# Patient Record
(demographics unavailable — no encounter records)

---

## 2017-09-08 NOTE — PD
__________________________________________________





History of Present Illness


Chief Complaint:  Psychiatric Symptoms


Time Seen by Provider:  11:30


Travel History


International Travel<30 Days:  No


Contact w/Intl Traveler<30days:  No


Known affected area:  No





Legal Status


Legal Status:  Baker Act


Baker Act Signed By:  Jason Tyler


Baker Act Comment:  Signed by [facility] D/S Evangelina Jones #[id number].


History of Present Illness:


32-year-old male brought in under a Severino act for cutting his left forearm last 

night and making suicidal threats according to his mother.  Patient denies 

being suicidal and states he had made a bet of $150 with a neighbor to cut his 

left forearm.  Patient states he did not mean to cut his arm so deeply and that 

his behavior was "dumb" and a "mistake".  Patient's mother has been spoken to, 

with patient's permission, and reiterates over the telephone that her son is 

mentally ill and that she has home security footage that he cut himself.  She 

further states he has been threatening suicide for the last few days.  Patient 

denies any suicidal or homicidal ideation, plan or intent at this time.  He 

demonstrates no psychotic symptoms and his cognition is intact.  He reports 

significant difficulty getting along with his mother and claims the last time 

he was here, he was incarcerated and she forged papers to obtain his truck and 

take his money.  Mother claims there is currently a warrant for his arrest and 

she wants his mental health issues addressed for the next 3 days of the Severino 

act.  She does not want her son at her home.  Patient was noted to be 

intoxicated last night and had an alcohol level of approximately 150 earlier 

this morning.  However, at this time he is not intoxicated and he is competent 

to make decisions.  He is verbally betina for safety and wants to get his 

vehicle and go back to [place] where he lives. 





This physician discussed the case with Robbie Holt, psychiatric service line 

, and both Mr. Holt and this physician feel the patient is not 

qualifying for a Severino act or involuntary psychiatric hospitalization at this 

time.  Patient appears to have personality characteristics which are 

interfering in his life, but these will not be changed during a three-day 

hospitalization.  Therefore, despite the ongoing risk of this patient acting out

, doing something dangerous to himself, or others, this risk is unpredictable 

and unavoidable.





[facility]


Past Medical History


Medical History:  Denies Significant Hx


Tetanus Vaccination:  < 5 Years


Influenza Vaccination:  No





Past Surgical History


Tonsillectomy:  Yes (AND ADENOIDS)





Psychiatric History


Psychiatric History


Hx Psychiatric Treatment:  


Patient denies any Hx of psychiatric tx.











States that he drinks ETOH approx 2x week. Stated that he uses marijuanna


approx 3-4 times a week.


once a week.


History of Inpatient Treatment:  No


Guns or firearms in home:  No





Social History


Hx Alcohol Use:  Yes (OCC)


Hx Tobacco Use:  Yes


Hx Substance Use:  No (Pt denies any Hx of abuse.)


Substance Use Type:  Alcohol, Marijuana


Hx of Substance Use Treatment:  No





Allergies-Medications


(Allergen,Severity, Reaction):  


Coded Allergies:  


     Penicillins (Verified  Allergy, Intermediate, RASH, 9/8/17)


     bee venom protein (honey bee) (Verified  Allergy, Unknown, RASH, 9/8/17)


     clindamycin (Verified  Allergy, Unknown, RASH, 9/8/17)


Reported Meds & Prescriptions





Reported Meds & Active Scripts


Active


No Active Prescriptions or Reported Medications





Review of Systems


Except as stated in HPI:  all other systems reviewed are Neg





Exam


Alert:  Yes


[place]:  Person, Place, Date, Situation


Mood:  Calm


Affect:  Appropriate


Speech:  Clear, Logical


Eye Contact:  Normal


Memory Intact:  Immediate, Recent, Remote


Delusions:  No


Insight/Judgement


Adequate





MDM


Medical Decision Making


Medical Record Reviewed:  Yes


Assessment/Plan


Medical record reviewed, patient interviewed at bedside, case discussed with 

nurse and service line .  Patient does not qualify for Baker acted 

this time and he does not qualify for involuntary psychiatric hospitalization.  

This physician appreciates the patient's mother's concerns, but the patient 

remains competent to make medical decisions and he is verbally betina for 

safety.  He demonstrates no suicidal or homicidal ideation, plan or intent at 

this time, no psychotic symptoms and his cognition is intact.  He can certainly 

follow up in [place], where he lives, with outpatient mental health treatment.  He 

has been recommended to do so.





Orders





 Orders


Complete Blood Count With Diff (9/8/17 05:32)


Basic Metabolic Panel (Bmp) (9/8/17 05:32)


Psych Screen (9/8/17 05:32)


Drug Screen, Random Urine (9/8/17 05:32)


Alcohol (Ethanol) (9/8/17 05:32)


Diet Regular Basic (9/8/17 Breakfast)





Results





Vital Signs








  Date Time  Temp Pulse Resp B/P (MAP) Pulse Ox O2 Delivery O2 Flow Rate FiO2


 


9/8/17 09:49 99.4 69 18 126/79 (95) 98 Room Air  


 


9/8/17 08:36        


 


9/8/17 07:05 97.8 78 17 124/81 (95) 99 Room Air  


 


9/8/17 07:05  78 17     


 


9/8/17 05:34 99.4 91 18 136/88 (104) 98   











Laboratory Tests








Test


  9/8/17


05:47 9/8/17


09:25


 


White Blood Count 10.1  


 


Red Blood Count 4.84  


 


Hemoglobin 15.5  


 


Hematocrit 45.3  


 


Mean Corpuscular Volume 93.7  


 


Mean Corpuscular Hemoglobin 32.1  


 


Mean Corpuscular Hemoglobin


Concent 34.3 


  


 


 


Red Cell Distribution Width 14.2  


 


Platelet Count 280  


 


Mean Platelet Volume 7.0  


 


Neutrophils (%) (Auto) 77.1  


 


Lymphocytes (%) (Auto) 16.8  


 


Monocytes (%) (Auto) 5.4  


 


Eosinophils (%) (Auto) 0.0  


 


Basophils (%) (Auto) 0.7  


 


Neutrophils # (Auto) 7.8  


 


Lymphocytes # (Auto) 1.7  


 


Monocytes # (Auto) 0.5  


 


Eosinophils # (Auto) 0.0  


 


Basophils # (Auto) 0.1  


 


CBC Comment DIFF FINAL  


 


Differential Comment   


 


Blood Urea Nitrogen 11  


 


Creatinine 1.02  


 


Random Glucose 97  


 


Calcium Level 8.6  


 


Sodium Level 140  


 


Potassium Level 4.1  


 


Chloride Level 110  


 


Carbon Dioxide Level 21.0  


 


Anion Gap 9  


 


Estimat Glomerular Filtration


Rate 85 


  


 


 


Ethyl Alcohol Level 147  


 


Urine Opiates Screen  NEG 


 


Urine Barbiturates Screen  NEG 


 


Urine Amphetamines Screen  NEG 


 


Urine Benzodiazepines Screen  NEG 


 


Urine Cocaine Screen  NEG 


 


Urine Cannabinoids Screen  POS 











Diagnosis





 Primary Impression:  


 Adjustment disorder with mixed disturbance of emotions and conduct


 Additional Impression:  


 Alcohol abuse


Prescriptions


No Active Prescriptions or Reported Meds


Condition:  Stable





Problem Qualifiers











Aleksandr Lua MD Sep 8, 2017 12:18

## 2017-09-08 NOTE — PD
HPI


Chief Complaint:  Psychiatric Symptoms


Time Seen by Provider:  05:31


Travel History


International Travel<30 days:  No


Contact w/Intl Traveler<30days:  No


Traveled to known affect area:  No





History of Present Illness


HPI


32-year-old male presents to the emergency department under Baker act for 

psychiatric evaluation.  Patient cut his left forearm this evening.  Patient 

states he does not know why he didn't you is "being stupid."  Per the Severino act

, the patient has had suicide attempts in the past and his mother was scared 

this was one of those.  Patient denies any psychiatric history.  Denies any 

illegal drug use except for marijuana.  He said he drinks alcohol sometimes.  

Patient was initially seen and evaluated at [facility] where the 

laceration was cleansed and approximated.  Patient was placed in a splint.  He 

has no other symptoms to report against time.





[facility]


Past Medical History


Medical History:  Denies Significant Hx


Tetanus Vaccination:  < 5 Years


Influenza Vaccination:  No





Past Surgical History


Tonsillectomy:  Yes (AND ADENOIDS)





Social History


Alcohol Use:  Yes (OCC)


Tobacco Use:  Yes


Substance Use:  No





Allergies-Medications


(Allergen,Severity, Reaction):  


Coded Allergies:  


     Penicillins (Verified  Allergy, Unknown, 9/8/17)


     bee venom protein (honey bee) (Verified  Allergy, Unknown, 9/8/17)


     clindamycin (Verified  Allergy, Unknown, 9/8/17)


Reported Meds & Prescriptions





Reported Meds & Active Scripts


Active


No Active Prescriptions or Reported Medications    








Review of Systems


Except as stated in HPI:  all other systems reviewed are Neg





Physical Exam


Narrative


GENERAL: Well-nourished male patient, with bizarre affect, no acute distress


SKIN: Focused skin assessment warm/dry.


HEAD: Atraumatic. Normocephalic. 


EYES: Pupils equal and round. No scleral icterus. No injection or drainage. 


ENT: No nasal bleeding or discharge.  Mucous membranes pink and moist.


NECK: Trachea midline. No JVD. 


CARDIOVASCULAR: Regular rate and rhythm.  No murmur appreciated.


RESPIRATORY: No accessory muscle use. Clear to auscultation. Breath sounds 

equal bilaterally. 


GASTROINTESTINAL: Abdomen soft, non-tender, nondistended. Hepatic and splenic 

margins not palpable. 


MUSCULOSKELETAL: No obvious deformities. No clubbing.  No cyanosis.  No edema.  

Left upper extremity splint in place.  Cap refill within normal limits of the 

affected extremity.


NEUROLOGICAL: Awake and alert. No obvious cranial nerve deficits.  Motor 

grossly within normal limits. Normal speech.





Data


Data


Last Documented VS





Vital Signs








  Date Time  Temp Pulse Resp B/P (MAP) Pulse Ox O2 Delivery O2 Flow Rate FiO2


 


9/8/17 05:34 99.4 91 18 136/88 (104) 98   








Orders





 Orders


Complete Blood Count With Diff (9/8/17 05:32)


Basic Metabolic Panel (Bmp) (9/8/17 05:32)


Psych Screen (9/8/17 05:32)


Drug Screen, Random Urine (9/8/17 05:32)


Alcohol (Ethanol) (9/8/17 05:32)





Labs





Laboratory Tests








Test


  9/8/17


05:47


 


White Blood Count 10.1 TH/MM3 


 


Red Blood Count 4.84 MIL/MM3 


 


Hemoglobin 15.5 GM/DL 


 


Hematocrit 45.3 % 


 


Mean Corpuscular Volume 93.7 FL 


 


Mean Corpuscular Hemoglobin 32.1 PG 


 


Mean Corpuscular Hemoglobin


Concent 34.3 % 


 


 


Red Cell Distribution Width 14.2 % 


 


Platelet Count 280 TH/MM3 


 


Mean Platelet Volume 7.0 FL 


 


Neutrophils (%) (Auto) 77.1 % 


 


Lymphocytes (%) (Auto) 16.8 % 


 


Monocytes (%) (Auto) 5.4 % 


 


Eosinophils (%) (Auto) 0.0 % 


 


Basophils (%) (Auto) 0.7 % 


 


Neutrophils # (Auto) 7.8 TH/MM3 


 


Lymphocytes # (Auto) 1.7 TH/MM3 


 


Monocytes # (Auto) 0.5 TH/MM3 


 


Eosinophils # (Auto) 0.0 TH/MM3 


 


Basophils # (Auto) 0.1 TH/MM3 


 


CBC Comment DIFF FINAL 


 


Differential Comment  


 


Blood Urea Nitrogen 11 MG/DL 


 


Creatinine 1.02 MG/DL 


 


Random Glucose 97 MG/DL 


 


Calcium Level 8.6 MG/DL 


 


Sodium Level 140 MEQ/L 


 


Potassium Level 4.1 MEQ/L 


 


Chloride Level 110 MEQ/L 


 


Carbon Dioxide Level 21.0 MEQ/L 


 


Anion Gap 9 MEQ/L 


 


Estimat Glomerular Filtration


Rate 85 ML/MIN 


 


 


Ethyl Alcohol Level 147 MG/DL 











[facility]


Medical Decision Making


Medical Screen Exam Complete:  Yes


Emergency Medical Condition:  Yes


Medical Record Reviewed:  Yes


Differential Diagnosis


Mood disorder versus personality disorder versus adjustment reaction disorder


Narrative Course


32-year-old male presents to emergency department for evaluation under Baker 

act.  Patient appears without distress.  He does have a very bizarre affect and 

is anxious pacing around the room.  Splint is in place in left upper extremity 

and the extremity is neurovascularly intact.  Lab work is ordered.  Pending no 

acute lab abnormality, patient is medically cleared to undergo psychiatric 

screening for further evaluation and disposition.


Mental health screening discussed with the patient. Psychiatric screen ordered.





Diagnosis





 Primary Impression:  


 Adjustment reaction


 Qualified Codes:  F43.20 - Adjustment disorder, unspecified


Scripts


No Active Prescriptions or Reported Meds


Condition:  Stable











Virgen Lopez Sep 8, 2017 06:17

## 2017-09-08 NOTE — PD
Physical Exam


Date Seen by Provider:  Sep 8, 2017


Time Seen by Provider:  12:44


Narrative


Patient was originally presenting with laceration to the left forearm, repaired 

at [facility] and brought here under the Baker act.  Patient was cleared 

by psychiatric staff, and felt to be stable for discharge.  Patient should 

follow-up with his laceration with his primary care physician or return to 

emergency department in 7 days for wound check and suture removal.  Psychiatric 

plan will be per Dr. Lua.





Data


Data


Last Documented VS





Vital Signs








  Date Time  Temp Pulse Resp B/P (MAP) Pulse Ox O2 Delivery O2 Flow Rate FiO2


 


9/8/17 09:49 99.4 69 18 126/79 (95) 98 Room Air  








Orders





 Orders


Complete Blood Count With Diff (9/8/17 05:32)


Basic Metabolic Panel (Bmp) (9/8/17 05:32)


Psych Screen (9/8/17 05:32)


Drug Screen, Random Urine (9/8/17 05:32)


Alcohol (Ethanol) (9/8/17 05:32)


Diet Regular Basic (9/8/17 Breakfast)





Labs





Laboratory Tests








Test


  9/8/17


05:47 9/8/17


09:25


 


White Blood Count 10.1 TH/MM3  


 


Red Blood Count 4.84 MIL/MM3  


 


Hemoglobin 15.5 GM/DL  


 


Hematocrit 45.3 %  


 


Mean Corpuscular Volume 93.7 FL  


 


Mean Corpuscular Hemoglobin 32.1 PG  


 


Mean Corpuscular Hemoglobin


Concent 34.3 % 


  


 


 


Red Cell Distribution Width 14.2 %  


 


Platelet Count 280 TH/MM3  


 


Mean Platelet Volume 7.0 FL  


 


Neutrophils (%) (Auto) 77.1 %  


 


Lymphocytes (%) (Auto) 16.8 %  


 


Monocytes (%) (Auto) 5.4 %  


 


Eosinophils (%) (Auto) 0.0 %  


 


Basophils (%) (Auto) 0.7 %  


 


Neutrophils # (Auto) 7.8 TH/MM3  


 


Lymphocytes # (Auto) 1.7 TH/MM3  


 


Monocytes # (Auto) 0.5 TH/MM3  


 


Eosinophils # (Auto) 0.0 TH/MM3  


 


Basophils # (Auto) 0.1 TH/MM3  


 


CBC Comment DIFF FINAL  


 


Differential Comment   


 


Blood Urea Nitrogen 11 MG/DL  


 


Creatinine 1.02 MG/DL  


 


Random Glucose 97 MG/DL  


 


Calcium Level 8.6 MG/DL  


 


Sodium Level 140 MEQ/L  


 


Potassium Level 4.1 MEQ/L  


 


Chloride Level 110 MEQ/L  


 


Carbon Dioxide Level 21.0 MEQ/L  


 


Anion Gap 9 MEQ/L  


 


Estimat Glomerular Filtration


Rate 85 ML/MIN 


  


 


 


Ethyl Alcohol Level 147 MG/DL  


 


Urine Opiates Screen  NEG 


 


Urine Barbiturates Screen  NEG 


 


Urine Amphetamines Screen  NEG 


 


Urine Benzodiazepines Screen  NEG 


 


Urine Cocaine Screen  NEG 


 


Urine Cannabinoids Screen  POS 











MDM


Medical Record Reviewed:  Yes


Supervised Visit with ED:  Yes


Narrative Course


Patient was originally presenting with laceration to the left forearm, repaired 

at [facility] and brought here under the Baker act.  Patient was cleared 

by psychiatric staff, and felt to be stable for discharge.  Patient should 

follow-up with his laceration with his primary care physician or return to 

emergency department in 7 days for wound check and suture removal.  Psychiatric 

plan will be per Dr. Lua.


Diagnosis





 Primary Impression:  


 Adjustment disorder with mixed disturbance of emotions and conduct


 Additional Impression:  


 Alcohol abuse


Patient Instructions:  General Instructions


Scripts


No Active Prescriptions or Reported Meds


Disposition:  01 DISCHARGE HOME


Condition:  Stable











Serafin Nguyen Sep 8, 2017 12:45